# Patient Record
Sex: FEMALE | Race: WHITE
[De-identification: names, ages, dates, MRNs, and addresses within clinical notes are randomized per-mention and may not be internally consistent; named-entity substitution may affect disease eponyms.]

---

## 2021-08-09 ENCOUNTER — HOSPITAL ENCOUNTER (OUTPATIENT)
Dept: HOSPITAL 47 - RADMAMWWP | Age: 76
End: 2021-08-09
Attending: INTERNAL MEDICINE
Payer: MEDICARE

## 2021-08-09 VITALS — DIASTOLIC BLOOD PRESSURE: 50 MMHG | SYSTOLIC BLOOD PRESSURE: 125 MMHG | HEART RATE: 69 BPM | TEMPERATURE: 97.5 F

## 2021-08-09 VITALS — RESPIRATION RATE: 16 BRPM

## 2021-08-09 DIAGNOSIS — Z85.3: ICD-10-CM

## 2021-08-09 DIAGNOSIS — N63.20: Primary | ICD-10-CM

## 2021-08-09 DIAGNOSIS — Z78.0: ICD-10-CM

## 2021-08-09 DIAGNOSIS — C50.012: ICD-10-CM

## 2021-08-09 DIAGNOSIS — Z80.3: ICD-10-CM

## 2021-08-09 LAB — BUN SERPL-SCNC: 17 MG/DL (ref 7–17)

## 2021-08-09 PROCEDURE — 19083 BX BREAST 1ST LESION US IMAG: CPT

## 2021-08-09 PROCEDURE — 77062 BREAST TOMOSYNTHESIS BI: CPT

## 2021-08-09 PROCEDURE — 84520 ASSAY OF UREA NITROGEN: CPT

## 2021-08-09 PROCEDURE — 36415 COLL VENOUS BLD VENIPUNCTURE: CPT

## 2021-08-09 PROCEDURE — 77066 DX MAMMO INCL CAD BI: CPT

## 2021-08-09 PROCEDURE — 76642 ULTRASOUND BREAST LIMITED: CPT

## 2021-08-09 PROCEDURE — 70460 CT HEAD/BRAIN W/DYE: CPT

## 2021-08-09 PROCEDURE — 77065 DX MAMMO INCL CAD UNI: CPT

## 2021-08-09 PROCEDURE — 38525 BIOPSY/REMOVAL LYMPH NODES: CPT

## 2021-08-09 PROCEDURE — 82565 ASSAY OF CREATININE: CPT

## 2021-08-09 PROCEDURE — 88305 TISSUE EXAM BY PATHOLOGIST: CPT

## 2021-08-09 NOTE — USB
EXAMINATION TYPE: US biopsy breast VAD LT

 

DATE OF EXAM: 8/9/2021

 

CLINICAL HISTORY: N63 Breast Lump mass,C50.012,Z03.89,Z85.3 HX OF CA.

 

TECHNIQUE: Ultrasound guided vaccuum assisted core biopsy of left breast.  

 

COMPARISON: Mammogram 8/9/2021

 

FINDINGS: The ultrasound guided core biopsy procedure was explained to the patient.  The risks, benef
its, alternatives were discussed.  An informed consent was then obtained.  

 

Timeout was performed.

 

The patient was placed in supine positioning for  imaging and for the procedure. The overlying skin w
as prepped with betadine and sterilely draped in usual sterile fashion.  Lidocaine 1% was used as ane
sthetic into the skin and deeper breast tissue up to area of concern in the breast. Lidocaine with ep
inephrine was used deeper regions adjacent to the lesion. A small skin nick was made with surgical sc
alpel.  

 

Under ultrasound guidance, a 12-gauge vacuum assisted biopsy device was used to obtain 6 core samples
.  A biopsy clip was left in lesion.  Waiting clip was placed at the lateral edge of the lesion on ul
trasound.

 

Good hemostasis was obtained with direct pressure.  Discharge instructions were discussed with the jimmie edwards.  The patient will follow up with the referring physician for results.

 

Postprocedure mammogram: The patient was transferred to mammography for physician ordered post proced
ure mammogram for clip placement verification.  The clip is in the expected region of the biopsy.

 

The patient tolerated the procedure well without any immediate complication.  The patient was dischar
ged to home in stable condition.  

 

 

 

IMPRESSION: 

1. Successful ultrasound guided biopsy left breast. 

 

Recommendations:

1. Recommendations are pending pathology results.

## 2021-08-10 NOTE — MM
Reason for exam: clinical finding.



History:

Patient is postmenopausal, has history of breast cancer at age 75, and history of 

other cancer.

Family history of breast cancer in sister at age 60.

Chemotherapy, March 2021.  Benign excisional biopsy of the right breast, 2001.



Physical Findings:

Nurse did not find any significant physical abnormalities on exam.



MG 3D Diag Mammo W/Cad VIJI

Bilateral CC and MLO view(s) were taken.

No prior studies available for comparison.

The breast tissue is heterogeneously dense. This may lower the sensitivity of 

mammography.

Finding: There are extensive linear, fine, round, grouped/clustered calcifications

in the upper outer quadrant, anterior position of the left breast 3cm from the 

nipple. Previous mammotome biopsy in the right breast.



These results were verbally communicated with the patient and result sheet given 

to the patient on 8/9/21.





ASSESSMENT: Incomplete: need additional imaging evaluation, BI-RAD 0



RECOMMENDATION:

Ultrasound of the left breast.

## 2021-08-10 NOTE — USB
Reason for exam: additional evaluation requested from abnormal screening.



History:

Patient is postmenopausal, has history of breast cancer at age 75, and history of 

other cancer.

Family history of breast cancer in sister at age 60.

Chemotherapy, March 2021.  Benign excisional biopsy of the right breast, 2001.



US Breast Axilla LT

Left complete breast ultrasound includes all four quadrants, the retroareolar 

region and axilla. Finding demonstrates a 1.6 x 1.2 x 1.8cm spiculated, irregular,

hypoechoic, vascular lesion at 1 o'clock and a duct ectasia with debris and 

calcification at the posterior nipple.



These results were verbally communicated with the patient and result sheet given 

to the patient on 8/9/21.





ASSESSMENT: Suspicious, BI-RAD 4



RECOMMENDATION:

Ultrasound core biopsy of the left breast.



Called Dr. Chung's office with mammographic findings.

Biopsy scheduled for 8/9/21 at 1:00.



PRELIMINARY REPORT CALLED AND FAXED TO DR. CHUNG ON 8/9/21.

## 2021-09-07 ENCOUNTER — HOSPITAL ENCOUNTER (OUTPATIENT)
Dept: HOSPITAL 47 - RADCTMAIN | Age: 76
Discharge: HOME | End: 2021-09-07
Attending: INTERNAL MEDICINE
Payer: MEDICARE

## 2021-09-07 DIAGNOSIS — Z03.89: Primary | ICD-10-CM

## 2021-09-07 DIAGNOSIS — C50.012: ICD-10-CM

## 2021-09-07 LAB — BUN SERPL-SCNC: 19 MG/DL (ref 7–17)

## 2021-09-07 PROCEDURE — 71260 CT THORAX DX C+: CPT

## 2021-09-07 PROCEDURE — 84520 ASSAY OF UREA NITROGEN: CPT

## 2021-09-07 PROCEDURE — 82565 ASSAY OF CREATININE: CPT

## 2021-09-07 PROCEDURE — 74177 CT ABD & PELVIS W/CONTRAST: CPT

## 2021-09-07 PROCEDURE — 36415 COLL VENOUS BLD VENIPUNCTURE: CPT

## 2021-09-07 NOTE — CT
EXAMINATION TYPE: CT ChestAbdPelvis w con

 

DATE OF EXAM: 9/7/2021

 

COMPARISON: None at this institution.

 

HISTORY: Breast cancer, METS suspected

 

CT DLP: 736.1 mGycm. Automated Exposure Control for Dose Reduction was Utilized.

 

CONTRAST: 

CT scan of the thorax, abdomen and pelvis is performed with oral and with IV Contrast, patient inject
ed with 100 mL of Isovue 300.

 

FINDINGS:

 

LUNGS: Mild to moderate biapical pleural/parenchymal scarring. Mild to moderate bibasilar parenchymal
 linear scarring and/or atelectasis. Slightly more nodular appearance just above diaphragm, for refer
ence and 1.9 x 1.4 cm nodule or nodular thickening axial image 47. Additional smaller areas of nodula
rity in both bases noted Reticulation in the superior aspect right lower lobe

 

MEDIASTINUM: There are no greater than 1 cm hilar or mediastinal lymph nodes. Tiny pericardial effusi
on is seen.  Mild cardiomegaly.

 

OTHER: Right internal jugular Mediport catheter terminates in SVC. Surgical changes left breast just 
above the nipple axial image 28. Nonspecific but suspicious 9 mm left axillary lymph node axial image
 18.

 

LIVER/GB: Scattered hypodense lesions throughout the liver, some consistent with benign thin-walled c
ysts, others too small to definitively characterize like right hepatic dome lesion on axial image 51.


 

PANCREAS: No significant abnormality is seen.

 

SPLEEN: No significant abnormality is seen.

 

ADRENALS: No significant abnormality is seen.

 

KIDNEYS: No significant abnormality is seen.

 

BOWEL: Small sized hiatal hernia. Oral contrast reaches level of right colon. No suspicious small or 
large bowel dilatation

 

GENITAL ORGANS: Slightly retroflexed uterus. Roughly 1.0 cm fundal calcification possible calcified f
ibroid axial image 102 anteriorly.

 

LYMPH NODES: No greater than 1cm abdominal or pelvic lymph nodes are appreciated.

 

OSSEOUS STRUCTURES: Demineralization is present. Kyphoplasty at moderate to severe compression fractu
re L4 level. Slight grade 1 anterolisthesis L3 on L4. Suspicious round sclerotic lesion posterior to 
T9 level sagittal image 57. Suspicious smaller sclerotic foci involving T10 vertebra are thought pres
ent. Incidental calcified disc T10-T11 level.

 

OTHER: No significant additional abnormality is seen.

 

IMPRESSION: Nonspecific but Suspicious 9 mm left axillary lymph node.  Moderate basilar parenchymal s
carring and/or atelectasis with slightly suspicious nodular component just above diaphragm bilaterall
y. Nonspecific liver lesions. Nonspecific but suspicious sclerotic foci largest lesion T9 level. All 
findings should be correlated with old outside imaging to better evaluate if neoplastic in origin and
 to determine need for further workup.

## 2021-10-01 ENCOUNTER — HOSPITAL ENCOUNTER (OUTPATIENT)
Dept: HOSPITAL 47 - RADPETMAIN | Age: 76
Discharge: HOME | End: 2021-10-01
Attending: INTERNAL MEDICINE
Payer: MEDICARE

## 2021-10-01 DIAGNOSIS — C50.012: Primary | ICD-10-CM

## 2021-10-01 DIAGNOSIS — R91.1: ICD-10-CM

## 2021-10-01 PROCEDURE — 78815 PET IMAGE W/CT SKULL-THIGH: CPT

## 2021-10-01 NOTE — PE
EXAMINATION TYPE: PET CT fusion skull to thigh

 

DATE OF EXAM: 10/1/2021

 

COMPARISON: Most recent full body CT September 7, 2021

 

HISTORY: Recent abnormal CT, solitary pulmonary nodule.   

Left-sided breast cancer diagnosed and treated 2011 per patient. Recent benign left breast biopsy Aug
ust 2021

 

TECHNIQUE:  Following the intravenous administration of 12.58 mCi of F-18 FDG, whole body images are 
performed from the skull base to the midthigh.  Images are reviewed on the computer in the coronal, a
xial, and sagittal planes.  Reconstructed rotating images are created on independent workstation and 
reviewed on the computer.   A localization and attenuation correction CT is performed in conjunction 
with the PET scan. Blood glucose level equals 82.

 

SCAN: Subsequent Scan

 

FINDINGS: 

 

SKULL BASE AND NECK:  No areas of abnormal hypermetabolic uptake.

 

CHEST, MEDIASTINUM, AND HILAR REGION: Suspect treatment changes to the left breast laterally with monika
cifications axial image 88. Slightly prominent but subcentimeter left axillary lymph node axial image
 76 shows no hypermetabolic uptake. Slight nodularity or nodular scarring posteriorly in the lung bas
es remains present without abnormal hypermetabolic uptake. No areas of abnormal hypermetabolic uptake
.

 

ABDOMEN AND PELVIS: No areas of abnormal hypermetabolic uptake. Normal excretion.

 

OSSEOUS STRUCTURES: No areas of abnormal hypermetabolic uptake.

 

OTHER CT: Stable right internal jugular Mediport catheter. Coronary artery calcification redemonstrat
ed. Stable tiny pericardial effusion and mild cardiomegaly.

 

Simple appearing thin-walled cysts throughout the liver redemonstrated. Gallbladder surgically absent
. Small-sized hiatal hernia redemonstrated. Calcified near 1 cm uterine fibroid in the fundus redemon
strated.

 

Vertebral plasty at mild to moderate compression fracture L4 level again seen. Underlying scoliotic c
urvature. Facet arthropathy lower lumbar levels.

 

IMPRESSION: No suspicious hypermetabolic uptake to suggest metastatic hypermetabolic malignancy.

## 2021-11-02 ENCOUNTER — HOSPITAL ENCOUNTER (INPATIENT)
Dept: HOSPITAL 47 - EC | Age: 76
LOS: 2 days | Discharge: HOME | DRG: 54 | End: 2021-11-04
Attending: HOSPITALIST | Admitting: HOSPITALIST
Payer: MEDICARE

## 2021-11-02 ENCOUNTER — HOSPITAL ENCOUNTER (OUTPATIENT)
Dept: HOSPITAL 47 - RADCTMAIN | Age: 76
Discharge: HOME | End: 2021-11-02
Attending: INTERNAL MEDICINE
Payer: MEDICARE

## 2021-11-02 DIAGNOSIS — E05.90: ICD-10-CM

## 2021-11-02 DIAGNOSIS — C50.012: Primary | ICD-10-CM

## 2021-11-02 DIAGNOSIS — Z79.899: ICD-10-CM

## 2021-11-02 DIAGNOSIS — Z91.013: ICD-10-CM

## 2021-11-02 DIAGNOSIS — G93.6: ICD-10-CM

## 2021-11-02 DIAGNOSIS — Z03.89: ICD-10-CM

## 2021-11-02 DIAGNOSIS — Z20.822: ICD-10-CM

## 2021-11-02 DIAGNOSIS — Z85.3: ICD-10-CM

## 2021-11-02 DIAGNOSIS — R94.6: ICD-10-CM

## 2021-11-02 DIAGNOSIS — Z91.14: ICD-10-CM

## 2021-11-02 DIAGNOSIS — Z79.890: ICD-10-CM

## 2021-11-02 DIAGNOSIS — C79.31: Primary | ICD-10-CM

## 2021-11-02 DIAGNOSIS — I10: ICD-10-CM

## 2021-11-02 DIAGNOSIS — Z87.01: ICD-10-CM

## 2021-11-02 DIAGNOSIS — K52.9: ICD-10-CM

## 2021-11-02 DIAGNOSIS — C79.51: ICD-10-CM

## 2021-11-02 DIAGNOSIS — Z87.891: ICD-10-CM

## 2021-11-02 DIAGNOSIS — E03.9: ICD-10-CM

## 2021-11-02 DIAGNOSIS — G62.9: ICD-10-CM

## 2021-11-02 LAB
ALBUMIN SERPL-MCNC: 4 G/DL (ref 3.5–5)
ALP SERPL-CCNC: 51 U/L (ref 38–126)
ALT SERPL-CCNC: 16 U/L (ref 4–34)
ANION GAP SERPL CALC-SCNC: 6 MMOL/L
APTT BLD: 24.8 SEC (ref 22–30)
AST SERPL-CCNC: 33 U/L (ref 14–36)
BASOPHILS # BLD AUTO: 0 K/UL (ref 0–0.2)
BASOPHILS NFR BLD AUTO: 1 %
BUN SERPL-SCNC: 20 MG/DL (ref 7–17)
BUN SERPL-SCNC: 21 MG/DL (ref 7–17)
CALCIUM SPEC-MCNC: 10.9 MG/DL (ref 8.4–10.2)
CHLORIDE SERPL-SCNC: 100 MMOL/L (ref 98–107)
CO2 SERPL-SCNC: 30 MMOL/L (ref 22–30)
EOSINOPHIL # BLD AUTO: 0.1 K/UL (ref 0–0.7)
EOSINOPHIL NFR BLD AUTO: 3 %
ERYTHROCYTE [DISTWIDTH] IN BLOOD BY AUTOMATED COUNT: 4.15 M/UL (ref 3.8–5.4)
ERYTHROCYTE [DISTWIDTH] IN BLOOD: 16.2 % (ref 11.5–15.5)
GLUCOSE SERPL-MCNC: 96 MG/DL (ref 74–99)
HCT VFR BLD AUTO: 38.6 % (ref 34–46)
HGB BLD-MCNC: 12.5 GM/DL (ref 11.4–16)
INR PPP: 1 (ref ?–1.2)
LYMPHOCYTES # SPEC AUTO: 1.5 K/UL (ref 1–4.8)
LYMPHOCYTES NFR SPEC AUTO: 30 %
MAGNESIUM SPEC-SCNC: 2.2 MG/DL (ref 1.6–2.3)
MCH RBC QN AUTO: 30 PG (ref 25–35)
MCHC RBC AUTO-ENTMCNC: 32.3 G/DL (ref 31–37)
MCV RBC AUTO: 93 FL (ref 80–100)
MONOCYTES # BLD AUTO: 0.3 K/UL (ref 0–1)
MONOCYTES NFR BLD AUTO: 6 %
NEUTROPHILS # BLD AUTO: 2.9 K/UL (ref 1.3–7.7)
NEUTROPHILS NFR BLD AUTO: 59 %
PLATELET # BLD AUTO: 206 K/UL (ref 150–450)
POTASSIUM SERPL-SCNC: 4.1 MMOL/L (ref 3.5–5.1)
PROT SERPL-MCNC: 6.8 G/DL (ref 6.3–8.2)
PT BLD: 10.6 SEC (ref 9–12)
SODIUM SERPL-SCNC: 136 MMOL/L (ref 137–145)
WBC # BLD AUTO: 5 K/UL (ref 3.8–10.6)

## 2021-11-02 PROCEDURE — 96374 THER/PROPH/DIAG INJ IV PUSH: CPT

## 2021-11-02 PROCEDURE — 85025 COMPLETE CBC W/AUTO DIFF WBC: CPT

## 2021-11-02 PROCEDURE — 96376 TX/PRO/DX INJ SAME DRUG ADON: CPT

## 2021-11-02 PROCEDURE — 84443 ASSAY THYROID STIM HORMONE: CPT

## 2021-11-02 PROCEDURE — 87635 SARS-COV-2 COVID-19 AMP PRB: CPT

## 2021-11-02 PROCEDURE — 85610 PROTHROMBIN TIME: CPT

## 2021-11-02 PROCEDURE — 36415 COLL VENOUS BLD VENIPUNCTURE: CPT

## 2021-11-02 PROCEDURE — 85730 THROMBOPLASTIN TIME PARTIAL: CPT

## 2021-11-02 PROCEDURE — 70553 MRI BRAIN STEM W/O & W/DYE: CPT

## 2021-11-02 PROCEDURE — 70470 CT HEAD/BRAIN W/O & W/DYE: CPT

## 2021-11-02 PROCEDURE — 82565 ASSAY OF CREATININE: CPT

## 2021-11-02 PROCEDURE — 96361 HYDRATE IV INFUSION ADD-ON: CPT

## 2021-11-02 PROCEDURE — 99285 EMERGENCY DEPT VISIT HI MDM: CPT

## 2021-11-02 PROCEDURE — 84484 ASSAY OF TROPONIN QUANT: CPT

## 2021-11-02 PROCEDURE — 80048 BASIC METABOLIC PNL TOTAL CA: CPT

## 2021-11-02 PROCEDURE — 80053 COMPREHEN METABOLIC PANEL: CPT

## 2021-11-02 PROCEDURE — 84520 ASSAY OF UREA NITROGEN: CPT

## 2021-11-02 PROCEDURE — 84100 ASSAY OF PHOSPHORUS: CPT

## 2021-11-02 PROCEDURE — 83735 ASSAY OF MAGNESIUM: CPT

## 2021-11-02 PROCEDURE — 84439 ASSAY OF FREE THYROXINE: CPT

## 2021-11-02 RX ADMIN — DEXTROSE STA MG: 50 INJECTION, SOLUTION INTRAVENOUS at 23:22

## 2021-11-02 RX ADMIN — CEFAZOLIN SCH: 330 INJECTION, POWDER, FOR SOLUTION INTRAMUSCULAR; INTRAVENOUS at 23:22

## 2021-11-02 RX ADMIN — DEXTROSE STA MG: 50 INJECTION, SOLUTION INTRAVENOUS at 22:12

## 2021-11-02 NOTE — CT
EXAMINATION TYPE: CT brain wo/w con

 

DATE OF EXAM: 11/2/2021

 

COMPARISON: CT brain August 9, 2021

 

HISTORY: Breast cancer, suspected mets

 

CT DLP: 1991.00 mGycm

Automated exposure control for dose reduction was used.

 

CONTRAST: 

CT scan of the head is performed without and with IV Contrast, patient injected with 100 ml mL of Iso
valentino 300.

 

FINDINGS: 

There is no acute intracranial hemorrhage or midline shift. New rim-enhancing 2.6 x 2.8 x 2.8 cm cyst
ic lesion with surrounding basal ganglia and right parietal lobe axial image 32 and coronal image 41.
 New peripheral lower right parietal 2.9 x 2.1 x 1.9 cm rim-enhancing cystic lesion axial image 21 an
d coronal image 41 with surrounding vasogenic edema. The ventricles and sulci remaining within normal
 limits in size.  The globes are intact and the visualized sinuses are clear. Nasal septum remains de
viated to left of midline.

 

IMPRESSION: 2 new rim-enhancing right-sided cystic lesions with local mass effect/surrounding vasogen
ic edema favor metastatic disease given history of breast cancer. Other etiologies not entirely exclu
ded.

 

A Orange level critical message alert has been initiated for Maureen Ellsworth MD via the [employer] 36
0 | Critical Results System on 11/2/2021 1:52 PM.  This message alert has been sent to Maureen Ellsworth MD via the preferences provided by the clinician for the receipt of Radiology Critical Findings. [facility] – [place] [id number].

## 2021-11-02 NOTE — ED
Recheck HPI





- General


Chief Complaint: Recheck/Abnormal Lab/Rx


Stated Complaint: Irregular CT


Time Seen by Provider: 11/02/21 21:32


Source: patient, family, RN notes reviewed, old records reviewed, Caregiver


Mode of arrival: ambulatory


Limitations: no limitations, altered mental status





- History of Present Illness


Initial Comments: 





This is a 75-year-old female to the emergency room x-ray.  Patient Dese for 

evaluation of altered mental status with altered an abnormal outpatient computed

tomography scan.  Patient presents today for evaluation regards to these 

symptoms.  Patient was called and told to come the hospital secondary to 

abnormal computed tomography scan.  Patient is aware of positive computed 

tomography scan findings for brain metastasis


MD Complaint: abnormal lab (Computed tomography scan positive for brain m

etastasis)


Initial Visit For: other (Occasional headaches and altered mental status)


Returns Today for: persistent/worsening pain related to initial visit


Symptoms Since Prior Visit: worsening pain


Context: called for abnormal lab result (Computed tomography scan abnormal)


Associated Symptoms: none


Treatments Prior to Arrival: home treatments





- Related Data


                                Home Medications











 Medication  Instructions  Recorded  Confirmed


 


Levothyroxine Sodium [Synthroid] 150 mcg PO DAILY 08/02/21 11/02/21


 


Calcium Carbonate [Calcium] 600 mg PO DAILY 11/02/21 11/02/21


 


Cholecalciferol [Vitamin D3 (25 25 mcg PO DAILY 11/02/21 11/02/21





Mcg = 1000 Iu)]   


 


Gabapentin [Neurontin] 300 mg PO DAILY 11/02/21 11/02/21


 


HYDROmorphone HCL 2 - 4 mg PO Q3H PRN 11/02/21 11/02/21


 


Sennosides [Senna] 17.2 mg PO DAILY 11/02/21 11/02/21


 


fentaNYL 50MCG/HR PATCH [Duragesic 50 mcg TRANSDERM Q72H 11/02/21 11/02/21





50MCG/HR]   











                                    Allergies











Allergy/AdvReac Type Severity Reaction Status Date / Time


 


shellfish derived [Shellfish] Allergy  Vomiting Verified 11/02/21 21:54














Review of Systems


ROS Statement: 


Those systems with pertinent positive or pertinent negative responses have been 

documented in the HPI.





ROS Other: All systems not noted in ROS Statement are negative.





Past Medical History


Past Medical History: Cancer, Hypertension, Pneumonia, Thyroid Disorder


Additional Past Medical History / Comment(s): cancer to the spine, brain, lungs,

 chemo 2021


History of Any Multi-Drug Resistant Organisms: None Reported


Past Surgical History: Back Surgery


Additional Past Surgical History / Comment(s): L4 surgery March 2021


Past Anesthesia/Blood Transfusion Reactions: No Reported Reaction


Past Psychological History: No Psychological Hx Reported


Smoking Status: Former smoker


Past Alcohol Use History: None Reported


Past Drug Use History: None Reported





General Exam


Limitations: no limitations


General appearance: alert, in no apparent distress


Head exam: Present: atraumatic, normocephalic, normal inspection


Eye exam: Present: normal appearance, PERRL, EOMI.  Absent: scleral icterus, 

conjunctival injection, periorbital swelling


ENT exam: Present: normal exam, mucous membranes moist


Neck exam: Present: normal inspection.  Absent: tenderness, meningismus, 

lymphadenopathy


Respiratory exam: Present: normal lung sounds bilaterally.  Absent: respiratory 

distress, wheezes, rales, rhonchi, stridor


Cardiovascular Exam: Present: regular rate, normal rhythm, normal heart sounds. 

 Absent: systolic murmur, diastolic murmur, rubs, gallop, clicks


GI/Abdominal exam: Present: soft, normal bowel sounds.  Absent: distended, 

tenderness, guarding, rebound, rigid


Extremities exam: Present: normal inspection, full ROM, normal capillary refill.

  Absent: tenderness, pedal edema, joint swelling, calf tenderness


Back exam: Present: normal inspection


Neurological exam: Present: alert, oriented X3, CN II-XII intact


Psychiatric exam: Present: normal affect, normal mood


Skin exam: Present: warm, dry, intact, normal color.  Absent: rash





Course


                                   Vital Signs











  11/02/21





  21:25


 


Temperature 98.7 F


 


Pulse Rate 71


 


Respiratory 20





Rate 


 


Blood Pressure 172/77


 


O2 Sat by Pulse 99





Oximetry 














- Reevaluation(s)


Reevaluation #1: 





11/02/21 22:50


Medical record is reviewed


Reevaluation #2: 





11/02/21 22:50


Patient family informed of results and questions have been answered





- Consultations


Consultation #1: 





Spoke with [facility] who agree to admit this patient





Medical Decision Making





- Medical Decision Making





75 female with breast cancer now no metastasis to brain.  Patient be admitted 

for steroids secondary to vasogenic edema, monitoring and cancer control





- Lab Data


Result diagrams: 


                                 11/02/21 21:56





                                 11/02/21 21:56


                                   Lab Results











  11/02/21 11/02/21 11/02/21 Range/Units





  21:56 21:56 21:56 


 


WBC  5.0    (3.8-10.6)  k/uL


 


RBC  4.15    (3.80-5.40)  m/uL


 


Hgb  12.5    (11.4-16.0)  gm/dL


 


Hct  38.6    (34.0-46.0)  %


 


MCV  93.0    (80.0-100.0)  fL


 


MCH  30.0    (25.0-35.0)  pg


 


MCHC  32.3    (31.0-37.0)  g/dL


 


RDW  16.2 H    (11.5-15.5)  %


 


Plt Count  206    (150-450)  k/uL


 


MPV  7.2    


 


Neutrophils %  59    %


 


Lymphocytes %  30    %


 


Monocytes %  6    %


 


Eosinophils %  3    %


 


Basophils %  1    %


 


Neutrophils #  2.9    (1.3-7.7)  k/uL


 


Lymphocytes #  1.5    (1.0-4.8)  k/uL


 


Monocytes #  0.3    (0-1.0)  k/uL


 


Eosinophils #  0.1    (0-0.7)  k/uL


 


Basophils #  0.0    (0-0.2)  k/uL


 


Anisocytosis  Slight    


 


PT   10.6   (9.0-12.0)  sec


 


INR   1.0   (<1.2)  


 


APTT   24.8   (22.0-30.0)  sec


 


Sodium    136 L  (137-145)  mmol/L


 


Potassium    4.1  (3.5-5.1)  mmol/L


 


Chloride    100  ()  mmol/L


 


Carbon Dioxide    30  (22-30)  mmol/L


 


Anion Gap    6  mmol/L


 


BUN    20 H  (7-17)  mg/dL


 


Creatinine    0.83  (0.52-1.04)  mg/dL


 


Est GFR (CKD-EPI)AfAm    81  (>60 ml/min/1.73 sqM)  


 


Est GFR (CKD-EPI)NonAf    70  (>60 ml/min/1.73 sqM)  


 


Glucose    96  (74-99)  mg/dL


 


Calcium    10.9 H  (8.4-10.2)  mg/dL


 


Phosphorus    3.9  (2.5-4.5)  mg/dL


 


Magnesium    2.2  (1.6-2.3)  mg/dL


 


Total Bilirubin    0.4  (0.2-1.3)  mg/dL


 


AST    33  (14-36)  U/L


 


ALT    16  (4-34)  U/L


 


Alkaline Phosphatase    51  ()  U/L


 


Troponin I     (0.000-0.034)  ng/mL


 


Total Protein    6.8  (6.3-8.2)  g/dL


 


Albumin    4.0  (3.5-5.0)  g/dL














  11/02/21 Range/Units





  21:56 


 


WBC   (3.8-10.6)  k/uL


 


RBC   (3.80-5.40)  m/uL


 


Hgb   (11.4-16.0)  gm/dL


 


Hct   (34.0-46.0)  %


 


MCV   (80.0-100.0)  fL


 


MCH   (25.0-35.0)  pg


 


MCHC   (31.0-37.0)  g/dL


 


RDW   (11.5-15.5)  %


 


Plt Count   (150-450)  k/uL


 


MPV   


 


Neutrophils %   %


 


Lymphocytes %   %


 


Monocytes %   %


 


Eosinophils %   %


 


Basophils %   %


 


Neutrophils #   (1.3-7.7)  k/uL


 


Lymphocytes #   (1.0-4.8)  k/uL


 


Monocytes #   (0-1.0)  k/uL


 


Eosinophils #   (0-0.7)  k/uL


 


Basophils #   (0-0.2)  k/uL


 


Anisocytosis   


 


PT   (9.0-12.0)  sec


 


INR   (<1.2)  


 


APTT   (22.0-30.0)  sec


 


Sodium   (137-145)  mmol/L


 


Potassium   (3.5-5.1)  mmol/L


 


Chloride   ()  mmol/L


 


Carbon Dioxide   (22-30)  mmol/L


 


Anion Gap   mmol/L


 


BUN   (7-17)  mg/dL


 


Creatinine   (0.52-1.04)  mg/dL


 


Est GFR (CKD-EPI)AfAm   (>60 ml/min/1.73 sqM)  


 


Est GFR (CKD-EPI)NonAf   (>60 ml/min/1.73 sqM)  


 


Glucose   (74-99)  mg/dL


 


Calcium   (8.4-10.2)  mg/dL


 


Phosphorus   (2.5-4.5)  mg/dL


 


Magnesium   (1.6-2.3)  mg/dL


 


Total Bilirubin   (0.2-1.3)  mg/dL


 


AST   (14-36)  U/L


 


ALT   (4-34)  U/L


 


Alkaline Phosphatase   ()  U/L


 


Troponin I  <0.012  (0.000-0.034)  ng/mL


 


Total Protein   (6.3-8.2)  g/dL


 


Albumin   (3.5-5.0)  g/dL














- Radiology Data


Radiology results: report reviewed (CT brain done an outpatient basis was 

positive for brain metastasis)





Disposition


Clinical Impression: 


 Altered mental state, Brain metastasis, Breast cancer





Disposition: ADMITTED AS IP TO THIS HOSP


Condition: Fair


Is patient prescribed a controlled substance at d/c from ED?: No


Referrals: 


Briseida Griffith MD [Primary Care Provider] - 1-2 days

## 2021-11-03 ENCOUNTER — HOSPITAL ENCOUNTER (OUTPATIENT)
Dept: HOSPITAL 47 - RADONCKCI | Age: 76
Discharge: HOME | End: 2021-11-03
Attending: RADIOLOGY
Payer: MEDICARE

## 2021-11-03 DIAGNOSIS — Z53.9: Primary | ICD-10-CM

## 2021-11-03 LAB
ALBUMIN SERPL-MCNC: 3.2 G/DL (ref 3.5–5)
ALP SERPL-CCNC: 39 U/L (ref 38–126)
ALT SERPL-CCNC: 14 U/L (ref 4–34)
ANION GAP SERPL CALC-SCNC: 5 MMOL/L
AST SERPL-CCNC: 27 U/L (ref 14–36)
BASOPHILS # BLD AUTO: 0 K/UL (ref 0–0.2)
BASOPHILS NFR BLD AUTO: 0 %
BUN SERPL-SCNC: 18 MG/DL (ref 7–17)
CALCIUM SPEC-MCNC: 9.9 MG/DL (ref 8.4–10.2)
CHLORIDE SERPL-SCNC: 102 MMOL/L (ref 98–107)
CO2 SERPL-SCNC: 29 MMOL/L (ref 22–30)
EOSINOPHIL # BLD AUTO: 0.1 K/UL (ref 0–0.7)
EOSINOPHIL NFR BLD AUTO: 3 %
ERYTHROCYTE [DISTWIDTH] IN BLOOD BY AUTOMATED COUNT: 3.54 M/UL (ref 3.8–5.4)
ERYTHROCYTE [DISTWIDTH] IN BLOOD: 16 % (ref 11.5–15.5)
GLUCOSE SERPL-MCNC: 82 MG/DL (ref 74–99)
HCT VFR BLD AUTO: 33.4 % (ref 34–46)
HGB BLD-MCNC: 10.7 GM/DL (ref 11.4–16)
LYMPHOCYTES # SPEC AUTO: 1.4 K/UL (ref 1–4.8)
LYMPHOCYTES NFR SPEC AUTO: 37 %
MCH RBC QN AUTO: 30.2 PG (ref 25–35)
MCHC RBC AUTO-ENTMCNC: 32 G/DL (ref 31–37)
MCV RBC AUTO: 94.5 FL (ref 80–100)
MONOCYTES # BLD AUTO: 0.2 K/UL (ref 0–1)
MONOCYTES NFR BLD AUTO: 6 %
NEUTROPHILS # BLD AUTO: 1.9 K/UL (ref 1.3–7.7)
NEUTROPHILS NFR BLD AUTO: 50 %
PLATELET # BLD AUTO: 170 K/UL (ref 150–450)
POTASSIUM SERPL-SCNC: 3.9 MMOL/L (ref 3.5–5.1)
PROT SERPL-MCNC: 5.8 G/DL (ref 6.3–8.2)
SODIUM SERPL-SCNC: 136 MMOL/L (ref 137–145)
T4 FREE SERPL-MCNC: 1.02 NG/DL (ref 0.78–2.19)
WBC # BLD AUTO: 3.9 K/UL (ref 3.8–10.6)

## 2021-11-03 RX ADMIN — CEFAZOLIN SCH MLS/HR: 330 INJECTION, POWDER, FOR SOLUTION INTRAMUSCULAR; INTRAVENOUS at 09:14

## 2021-11-03 RX ADMIN — PANTOPRAZOLE SODIUM SCH MG: 40 TABLET, DELAYED RELEASE ORAL at 17:34

## 2021-11-03 RX ADMIN — DEXTROSE SCH MG: 50 INJECTION, SOLUTION INTRAVENOUS at 09:14

## 2021-11-03 RX ADMIN — DEXTROSE SCH MG: 50 INJECTION, SOLUTION INTRAVENOUS at 05:13

## 2021-11-03 RX ADMIN — CALCIUM CARBONATE (ANTACID) CHEW TAB 500 MG PRN MG: 500 CHEW TAB at 15:51

## 2021-11-03 RX ADMIN — CEFAZOLIN SCH MLS/HR: 330 INJECTION, POWDER, FOR SOLUTION INTRAMUSCULAR; INTRAVENOUS at 17:34

## 2021-11-03 RX ADMIN — DEXTROSE SCH: 50 INJECTION, SOLUTION INTRAVENOUS at 10:22

## 2021-11-03 RX ADMIN — DEXTROSE SCH MG: 50 INJECTION, SOLUTION INTRAVENOUS at 20:36

## 2021-11-03 RX ADMIN — DEXTROSE SCH MG: 50 INJECTION, SOLUTION INTRAVENOUS at 15:52

## 2021-11-03 RX ADMIN — CALCIUM CARBONATE (ANTACID) CHEW TAB 500 MG PRN MG: 500 CHEW TAB at 21:12

## 2021-11-03 NOTE — P.HPIM
History of Present Illness


Patient was a 75-year-old female was sent in because of her memory difficulties 

and patient is found to have computed tomography scan as an outpatient and found

to have a metastatic lesion in the brain along with vasogenic edema after which 

patient was a presented to the hospital and patient will undergo MRI imaging.  

Patient recently moved to the town patient was a diagnosed with a breast cancer 

received chemotherapy at her previous location patient is still undergoing 

workup now here and does not initiated on chemotherapy here in this town.  

Patient denied any fever chills patient has elevated TSH with normal T4 patient 

is supposed to take levothyroxine which she has not been taking and patient is 

being resumed on that medication at this time.











REVIEW OF SYSTEMS: 


CONSTITUTIONAL: No fever, no malaise, no fatigue. 


HEENT: No recent visual problems or hearing problems. Denied any sore throat. 


CARDIOVASCULAR: No chest pain, orthopnea, PND, no palpitations, no syncope. 


PULMONARY: No shortness of breath, no cough, no hemoptysis. 


GASTROINTESTINAL: No diarrhea, no nausea, no vomiting, no abdominal pain. 


NEUROLOGICAL: No headaches, no weakness, no numbness. 


HEMATOLOGICAL: Denies any bleeding or petechiae. 


GENITOURINARY: Denies any burning micturition, frequency, or urgency. 


MUSCULOSKELETAL/RHEUMATOLOGICAL: Denies any joint pain, swelling, or any muscle 

pain. 


ENDOCRINE: Denies any polyuria or polydipsia. 





The rest of the 14-point review of systems is negative.











PHYSICAL EXAMINATION: 





GENERAL: The patient is alert and oriented x3, not in any acute distress. Well 

developed, well nourished. 


HEENT: Pupils are round and equally reacting to light. EOMI. No scleral icterus.

No conjunctival pallor. Normocephalic, atraumatic. No pharyngeal erythema. No 

thyromegaly. 


CARDIOVASCULAR: S1 and S2 present. No murmurs, rubs, or gallops. 


PULMONARY: Chest is clear to auscultation, no wheezing or crackles. 


ABDOMEN: Soft, nontender, nondistended, normoactive bowel sounds. No palpable 

organomegaly. 


MUSCULOSKELETAL: No joint swelling or deformity.


EXTREMITIES: No cyanosis, clubbing, or pedal edema. 


NEUROLOGICAL: Gross neurological examination did not reveal any focal deficits. 


SKIN: No rashes. 





Assessment and plan


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 

Decadron which will be continued patient will be monitored closely.  Oncology 

evaluated the patient. 


- hyperthyroidism patient will be resumed on her levothyroxine patient has 

elevated TSH as patient has not been taking her levothyroxine.


-Breast cancer metastatic disease not starting chemotherapy at patient has 

metastases to spine


-Hypertension


-Peripheral neuropathy


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 

was resumed patient denied any constipation at this time





DVT prophylaxis: Lovenox








Past Medical History


Past Medical History: Cancer, Hypertension, Pneumonia, Thyroid Disorder


Additional Past Medical History / Comment(s): cancer to the breast Ca with mets 

to spine, brain, liver, lungs, chemo 2021


History of Any Multi-Drug Resistant Organisms: None Reported


Past Surgical History: Back Surgery


Additional Past Surgical History / Comment(s): L4 surgery March 2021


Past Anesthesia/Blood Transfusion Reactions: No Reported Reaction


Past Psychological History: No Psychological Hx Reported


Smoking Status: Former smoker


Past Alcohol Use History: None Reported


Past Drug Use History: None Reported





- Past Family History


  ** Father


Family Medical History: Cancer


Additional Family Medical History / Comment(s): colon





  ** Sister(s)


Family Medical History: Cancer


Additional Family Medical History / Comment(s): breast





Medications and Allergies


                                Home Medications











 Medication  Instructions  Recorded  Confirmed  Type


 


Levothyroxine Sodium [Synthroid] 150 mcg PO DAILY 08/02/21 11/03/21 History


 


Calcium Carbonate [Calcium] 600 mg PO DAILY 11/02/21 11/03/21 History


 


Cholecalciferol [Vitamin D3 (25 25 mcg PO DAILY 11/02/21 11/03/21 History





Mcg = 1000 Iu)]    


 


Gabapentin [Neurontin] 300 mg PO DAILY 11/02/21 11/03/21 History


 


HYDROmorphone HCL 2 - 4 mg PO Q3H PRN 11/02/21 11/03/21 History


 


Sennosides [Senna] 17.2 mg PO DAILY 11/02/21 11/03/21 History


 


fentaNYL 50MCG/HR PATCH [Duragesic 50 mcg TRANSDERM Q72H 11/02/21 11/03/21 

History





50MCG/HR]    








                                    Allergies











Allergy/AdvReac Type Severity Reaction Status Date / Time


 


shellfish derived [Shellfish] Allergy  Vomiting Verified 11/03/21 11:28














Physical Exam


Vitals: 


                                   Vital Signs











  Temp Pulse Resp BP Pulse Ox


 


 11/03/21 10:58      97


 


 11/03/21 07:26  98.0 F  66  18  147/81  100


 


 11/03/21 06:55   61  18  129/66  99


 


 11/03/21 04:48   75  16  156/81  95


 


 11/02/21 21:25  98.7 F  71  20  172/77  99








                                Intake and Output











 11/02/21 11/03/21 11/03/21





 22:59 06:59 14:59


 


Other:   


 


  Weight 77.111 kg  78.5 kg














Results


CBC & Chem 7: 


                                 11/03/21 03:11





                                 11/03/21 03:11


Labs: 


                  Abnormal Lab Results - Last 24 Hours (Table)











  11/02/21 11/02/21 11/02/21 Range/Units





  21:56 21:56 21:56 


 


RBC     (3.80-5.40)  m/uL


 


Hgb     (11.4-16.0)  gm/dL


 


Hct     (34.0-46.0)  %


 


RDW  16.2 H    (11.5-15.5)  %


 


Sodium   136 L   (137-145)  mmol/L


 


BUN   20 H   (7-17)  mg/dL


 


Calcium   10.9 H   (8.4-10.2)  mg/dL


 


Total Protein     (6.3-8.2)  g/dL


 


Albumin     (3.5-5.0)  g/dL


 


TSH    49.400 H  (0.465-4.680)  mIU/L














  11/03/21 11/03/21 Range/Units





  03:11 03:11 


 


RBC  3.54 L   (3.80-5.40)  m/uL


 


Hgb  10.7 L   (11.4-16.0)  gm/dL


 


Hct  33.4 L   (34.0-46.0)  %


 


RDW  16.0 H   (11.5-15.5)  %


 


Sodium   136 L  (137-145)  mmol/L


 


BUN   18 H  (7-17)  mg/dL


 


Calcium    (8.4-10.2)  mg/dL


 


Total Protein   5.8 L  (6.3-8.2)  g/dL


 


Albumin   3.2 L  (3.5-5.0)  g/dL


 


TSH    (0.465-4.680)  mIU/L














Thrombosis Risk Factor Assmnt





- Choose All That Apply


Any of the Below Risk Factors Present?: Yes


Each Factor Represents 1 point: Obesity (BMI >25)


Other Risk Factors: Yes


Each Risk Factor Represents 2 Points: Age 61-74 years, Malignancy


Other congenital or acquired thrombophilia - If yes, enter type in comment: No


Thrombosis Risk Factor Assessment Total Risk Factor Score: 5


Thrombosis Risk Factor Assessment Level: High Risk

## 2021-11-03 NOTE — MR
EXAMINATION TYPE: MR brain wo/w con

 

DATE OF EXAM: 11/3/2021

 

COMPARISON: CT brain from 1 day earlier.

 

HISTORY: Breast cancer, abnormal CT.

 

TECHNIQUE: 

Multiplanar, multisequence images of the brain and brainstem is performed without and with IV contras
t, utilizing 8 mL intravenous Gadavist .

 

FINDINGS: Diffusion weighted images demonstrate no evidence of a recent infarct or other diffusion ab
normality.  There is mild ventricular and sulcal prominence.

 

Midline structures demonstrate normal morphology.  The craniocervical junction appears within normal 
limits.  

 

There is persistent thin-walled cystic lesion with peripheral rim enhancement that has some anterior 
septation and posterior more thickened nodular component high right parieto-occipital level measuring
 3.4 x 2.8 x 3.2 cm axial image 16 and coronal image 78 with surrounding vasogenic edema extending po
steriorly and inferiorly. There is second thin-walled cyst or cystic lesion measuring 2.9 x 2.3 x 1.9
 cm axial image 43 and coronal image 78 rim and thin septal enhancement with a enhancing thickened co
mponent posterior inferiorly in the inferior right parietal region. Less well seen on CT there is a t
hird rim-enhancing 6 mm focus with surrounding vasogenic edema high posterior left frontal lobe axial
 image 63.

 

 Dural venous sinuses appear patent. The visualized sinuses are clear and the globes are intact. Nasa
l septum remains deviated to left of midline.

 

IMPRESSION: 2 rim-enhancing larger cystic lesions redemonstrated right parietal occipital level and i
nferior right parietal level. There is a third rim-enhancing 6 mm cystic lesion posterior left fronta
l lobe. New findings from August 19, 2021 CT. Differential is somewhat extensive but  metastatic dise
ase is favored in patient with history of recently diagnosed breast cancer.

## 2021-11-03 NOTE — P.CONS
History of Present Illness





- Reason for Consult


Consult date: 11/03/21


Metastatic Breast Cancer


Requesting physician: Yuriy Davis





- Chief Complaint


MS changes





- History of Present Illness





Mrs. Sierra is a pleasant female who presented to the emergency department 

after she had a CT scan that revealed two likely metastatic lesions in the brain

with associated surrounding vasogenic edema. No midline shift identified. 

Patient was diagnosed with Metastatic breast cancer in March 2021, She underwent

molecular and biomarker testing and started on Abraxane and atezolumab. She also

underwent palliaitive radiation to destructive lesion in T4. In June 2021 her 

treatment was unfortunetley complicated with colitis and pneumonitis requiring 

hospitalizations. Her most recent PET scan did not show any progressive disease 

or new metastatic lesions. However her mental status, memory, emotional control 

has been labile per daughter (who she lives with). She had initially refused MRI

of brain but agreed to CT. CT scan revealing two new lesions on 11/2/21, with 

patient symptomatic and worsening over past week it was unclear if this was r

elated to new brain mets or thyroid (she had stopped taking her synthroid 

without telling anyone per daughter). 





She was given a bolus dose dexamethasone in hospital, and then continued on 4mg 

q6, with PPI. MRI brain ordered and plan for ativan prior, discussed with 

patient and daughter. Medical team managing thyroid. 





Review of Systems


All systems: negative


Constitutional: Reports as per HPI





Past Medical History


Past Medical History: Cancer, Hypertension, Pneumonia, Thyroid Disorder


Additional Past Medical History / Comment(s): cancer to the spine, brain, lungs,

chemo 2021


History of Any Multi-Drug Resistant Organisms: None Reported


Past Surgical History: Back Surgery


Additional Past Surgical History / Comment(s): L4 surgery March 2021


Past Anesthesia/Blood Transfusion Reactions: No Reported Reaction


Past Psychological History: No Psychological Hx Reported


Smoking Status: Former smoker


Past Alcohol Use History: None Reported


Past Drug Use History: None Reported





- Past Family History


  ** Father


Family Medical History: Cancer


Additional Family Medical History / Comment(s): colon





  ** Sister(s)


Family Medical History: Cancer


Additional Family Medical History / Comment(s): breast





Medications and Allergies


                                Home Medications











 Medication  Instructions  Recorded  Confirmed  Type


 


Levothyroxine Sodium [Synthroid] 150 mcg PO DAILY 08/02/21 11/03/21 History


 


Calcium Carbonate [Calcium] 600 mg PO DAILY 11/02/21 11/03/21 History


 


Cholecalciferol [Vitamin D3 (25 25 mcg PO DAILY 11/02/21 11/03/21 History





Mcg = 1000 Iu)]    


 


Gabapentin [Neurontin] 300 mg PO DAILY 11/02/21 11/03/21 History


 


HYDROmorphone HCL 2 - 4 mg PO Q3H PRN 11/02/21 11/03/21 History


 


Sennosides [Senna] 17.2 mg PO DAILY 11/02/21 11/03/21 History


 


fentaNYL 50MCG/HR PATCH [Duragesic 50 mcg TRANSDERM Q72H 11/02/21 11/03/21 

History





50MCG/HR]    








                                    Allergies











Allergy/AdvReac Type Severity Reaction Status Date / Time


 


shellfish derived [Shellfish] Allergy  Vomiting Verified 11/03/21 11:28














Physical Exam


Vitals: 


                                   Vital Signs











  Temp Pulse Resp BP Pulse Ox


 


 11/03/21 07:26  98.0 F  66  18  147/81  100


 


 11/03/21 06:55   61  18  129/66  99


 


 11/03/21 04:48   75  16  156/81  95


 


 11/02/21 21:25  98.7 F  71  20  172/77  99








                                Intake and Output











 11/02/21 11/03/21 11/03/21





 22:59 06:59 14:59


 


Other:   


 


  Weight 77.111 kg  














- Constitutional


General appearance: cooperative, no acute distress





- EENT


Eyes: EOMI


ENT: NA/AT





- Respiratory


Respiratory: bilateral: CTA





- Cardiovascular


Rhythm: regular





- Gastrointestinal


General gastrointestinal: normal bowel sounds, soft





- Integumentary


Integumentary: pale





- Musculoskeletal


Musculoskeletal: generalized weakness





- Psychiatric





poor historian


Psychiatric: A&O x's 3





Results


CBC & Chem 7: 


                                 11/03/21 03:11





                                 11/03/21 03:11


Labs: 


                  Abnormal Lab Results - Last 24 Hours (Table)











  11/02/21 11/02/21 11/03/21 Range/Units





  21:56 21:56 03:11 


 


RBC    3.54 L  (3.80-5.40)  m/uL


 


Hgb    10.7 L  (11.4-16.0)  gm/dL


 


Hct    33.4 L  (34.0-46.0)  %


 


RDW  16.2 H   16.0 H  (11.5-15.5)  %


 


Sodium   136 L   (137-145)  mmol/L


 


BUN   20 H   (7-17)  mg/dL


 


Calcium   10.9 H   (8.4-10.2)  mg/dL


 


Total Protein     (6.3-8.2)  g/dL


 


Albumin     (3.5-5.0)  g/dL














  11/03/21 Range/Units





  03:11 


 


RBC   (3.80-5.40)  m/uL


 


Hgb   (11.4-16.0)  gm/dL


 


Hct   (34.0-46.0)  %


 


RDW   (11.5-15.5)  %


 


Sodium  136 L  (137-145)  mmol/L


 


BUN  18 H  (7-17)  mg/dL


 


Calcium   (8.4-10.2)  mg/dL


 


Total Protein  5.8 L  (6.3-8.2)  g/dL


 


Albumin  3.2 L  (3.5-5.0)  g/dL











CT Scan - head: report reviewed





Assessment and Plan


(1) Brain metastasis


Current Visit: Yes   Status: Acute   Code(s): C79.31 - SECONDARY MALIGNANT 

NEOPLASM OF BRAIN   SNOMED Code(s): 56474266


   





(2) Breast cancer


Current Visit: Yes   Status: Acute   Code(s): C50.919 - MALIGNANT NEOPLASM OF 

UNSP SITE OF UNSPECIFIED FEMALE BREAST   SNOMED Code(s): 897205272


   





(3) Vasogenic brain edema


Current Visit: Yes   Status: Acute   Code(s): G93.6 - CEREBRAL EDEMA   SNOMED 

Code(s): 675550682


   


Plan: 





MRI Brain with and Without Contrast - Lorazepam prior for sedation


Dexamethasone 4mg q6 for vasogenic edema and PPPI


Dr. Diaz from Radiation oncology consulted, discussed with him for possible 

plan of gamma knofe to two lesions





Physician attest: I have completed the full history and physical and agree with 

above dictation, dictated as a ascribe.

## 2021-11-04 VITALS — SYSTOLIC BLOOD PRESSURE: 139 MMHG | DIASTOLIC BLOOD PRESSURE: 71 MMHG | TEMPERATURE: 96.9 F | HEART RATE: 71 BPM

## 2021-11-04 VITALS — RESPIRATION RATE: 17 BRPM

## 2021-11-04 LAB
ANION GAP SERPL CALC-SCNC: 5 MMOL/L
BUN SERPL-SCNC: 17 MG/DL (ref 7–17)
CALCIUM SPEC-MCNC: 10.1 MG/DL (ref 8.4–10.2)
CHLORIDE SERPL-SCNC: 106 MMOL/L (ref 98–107)
CO2 SERPL-SCNC: 27 MMOL/L (ref 22–30)
GLUCOSE SERPL-MCNC: 107 MG/DL (ref 74–99)
POTASSIUM SERPL-SCNC: 4.1 MMOL/L (ref 3.5–5.1)
SODIUM SERPL-SCNC: 138 MMOL/L (ref 137–145)

## 2021-11-04 RX ADMIN — CEFAZOLIN SCH MLS/HR: 330 INJECTION, POWDER, FOR SOLUTION INTRAMUSCULAR; INTRAVENOUS at 05:21

## 2021-11-04 RX ADMIN — DEXTROSE SCH MG: 50 INJECTION, SOLUTION INTRAVENOUS at 11:28

## 2021-11-04 RX ADMIN — PANTOPRAZOLE SODIUM SCH MG: 40 TABLET, DELAYED RELEASE ORAL at 07:51

## 2021-11-04 RX ADMIN — DEXTROSE SCH MG: 50 INJECTION, SOLUTION INTRAVENOUS at 05:22

## 2021-11-04 NOTE — P.CONS
History of Present Illness





- Reason for Consult


Consult date: 11/03/21


brain metastases


Requesting physician: Joe Galindo





- Chief Complaint


confusion





- History of Present Illness


The patient is a 75-year-old female with a history of metastatic triple negative

cancer of the left breast.  She was initially diagnosed with stage IV disease in

March 2021 with disease involving the liver, lungs and thoracic spine.  She 

underwent 3 cycles of systemic therapy with Abraxane/Atezo and had an excellent 

response.  Her most recent PET/CT from October 1 shows no active malignancy.  

Unfortunately, the patient now presents with a new finding of brain metastasis.





The patient's daughter reports that approximately 2 weeks ago she noticed some 

changes with her mom's thinking.  The patient reported she was having some 

difficulty balancing her checkbook and had some episodes of confusion which were

very unusual for her.  She reports no difficulty with headaches, but has had a 

couple episodes of nausea.  The patient underwent a CT scan of the brain on 

November 2 which revealed a new 2.6 x 2.8 cm cystic lesion in the right parietal

lobe, as well as in the right lower parietal lobe A separate 2.9 x 2.1 cm mass 

with vasogenic edema.  Her daughter feels she has had some spatial issues as 

well.  The patient continues to have treatment for pain felt to be related to 

her malignancy, which is relatively unchanged (if anything improved).  





With her recent CT findings, the patient was recommended to come through the ER.

 She has been started on dexamethasone, and her thinking does seem more clear at

the time of our conversation.  MRI of the brain was performed on November 3, 

which redemonstrated the 2 cystic lesions in the right temporal lobe, as well as

a separate 6 mm lesion in the left posterior frontal region.











Review of Systems


Constitutional: Denies chills, Denies fever


Eyes: denies blurred vision


Ears: deny: decreased hearing


Ears, nose, mouth and throat: Denies headache


Cardiovascular: Denies chest pain


Respiratory: Denies cough, Denies dyspnea


Gastrointestinal: Reports nausea, Reports vomiting


Genitourinary: Denies flank pain


Musculoskeletal: Reports gait dysfunction


Integumentary: Denies rash


Neurological: Reports change in mentation, Reports confusion, Reports gait 

dysfunction, Denies change in speech, Denies convulsions, Denies double vision, 

Denies headaches, Denies loss of vision, Denies memory loss, Denies numbness, 

Denies paralysis, Denies paresthesias, Denies syncope, Denies weakness





Past Medical History


Past Medical History: Cancer, Hypertension, Pneumonia, Thyroid Disorder


Additional Past Medical History / Comment(s): cancer to the spine, brain, lungs,

chemo 2021


History of Any Multi-Drug Resistant Organisms: None Reported


Past Surgical History: Back Surgery


Additional Past Surgical History / Comment(s): L4 surgery March 2021


Past Anesthesia/Blood Transfusion Reactions: No Reported Reaction


Past Psychological History: No Psychological Hx Reported


Smoking Status: Former smoker


Past Alcohol Use History: None Reported


Past Drug Use History: None Reported





- Past Family History


  ** Father


Family Medical History: Cancer


Additional Family Medical History / Comment(s): colon





  ** Sister(s)


Family Medical History: Cancer


Additional Family Medical History / Comment(s): breast





Medications and Allergies


                                Home Medications











 Medication  Instructions  Recorded  Confirmed  Type


 


Levothyroxine Sodium [Synthroid] 150 mcg PO DAILY 08/02/21 11/03/21 History


 


Calcium Carbonate [Calcium] 600 mg PO DAILY 11/02/21 11/03/21 History


 


Cholecalciferol [Vitamin D3 (25 25 mcg PO DAILY 11/02/21 11/03/21 History





Mcg = 1000 Iu)]    


 


Gabapentin [Neurontin] 300 mg PO DAILY 11/02/21 11/03/21 History


 


HYDROmorphone HCL 2 - 4 mg PO Q3H PRN 11/02/21 11/03/21 History


 


Sennosides [Senna] 17.2 mg PO DAILY 11/02/21 11/03/21 History


 


fentaNYL 50MCG/HR PATCH [Duragesic 50 mcg TRANSDERM Q72H 11/02/21 11/03/21 

History





50MCG/HR]    








                                    Allergies











Allergy/AdvReac Type Severity Reaction Status Date / Time


 


shellfish derived [Shellfish] Allergy  Vomiting Verified 11/03/21 11:28














Physical Exam


Vitals: 


                                   Vital Signs











  Temp Pulse Resp BP Pulse Ox


 


 11/04/21 05:26  96.9 F L  71  18  139/71  99


 


 11/04/21 02:18  96.2 F L  90  18  160/77  97


 


 11/03/21 20:00   95  17  


 


 11/03/21 14:00  98.6 F  95  17  113/68  95


 


 11/03/21 10:58      97








                                Intake and Output











 11/03/21 11/04/21 11/04/21





 22:59 06:59 14:59


 


Intake Total 750 600 


 


Balance 750 600 


 


Intake:   


 


  Intake, IV Titration 750 600 





  Amount   


 


    Sodium Chloride 0.9% 1, 750 600 





    000 ml @ 75 mls/hr IV .   





    [id number] [facility] Rx#:[id number]   


 


Other:   


 


  # Voids 1  














- Constitutional


General appearance: no acute distress





- EENT


Eyes: EOMI, PERRLA


ENT: hearing grossly normal





- Neck


Neck: no lymphadenopathy





- Respiratory


Respiratory: bilateral: CTA





- Cardiovascular


Rhythm: regular





- Gastrointestinal


General gastrointestinal: no soft, no tenderness





- Integumentary


Integumentary: no calor, no cellulitis





- Neurologic


Neurologic: CNII-XII intact





- Musculoskeletal


Musculoskeletal: strength equal bilaterally





- Psychiatric


Psychiatric: A&O x's 3, appropriate affect





Results


CBC & Chem 7: 


                                 11/03/21 03:11





                                 11/03/21 03:11


Labs: 


                  Abnormal Lab Results - Last 24 Hours (Table)











  11/02/21 Range/Units





  21:56 


 


TSH  49.400 H  (0.465-4.680)  mIU/L











CT Scan - head: report reviewed, image reviewed


MRI - head: report reviewed, image reviewed





Assessment and Plan


Assessment: 


The patient is a 75-year-old female with a history of metastatic triple negative

 cancer of the left breast.  She was initially diagnosed with stage IV disease 

in March 2021 with disease involving the liver, lungs and thoracic spine.  She 

underwent 3 cycles of systemic therapy with Abraxane/Atezo and had an excellent 

response.  Her most recent PET/CT from October 1 shows no active malignancy.  

Unfortunately, the patient now presents with a new finding of brain metastasis.





1.  Brain metastases:  This is the most likely cause of the patient's confusion,

 mild nausea and episodes of altered mentation.  Based on the patient's MRI, she

 presents with 3 separate metastatic lesions involving the brain.  I discussed 

with the patient that provided she had a small number of lesions, she would be a

 candidate for radiosurgery based treatment.  I discussed continuation of the 

patient's dexamethasone as her imaging does show some vasogenic edema.





I discussed with the patient that she would first require a CT simulation for 

treatment planning.  Treatment would be delivered over approximately 3-4 

fractions.   I discussed typical side effects of this treatment which includes, 

but are not limited to; fatigue, scalp irritation, reversible alopecia, 

headache, symptoms associated with edema such as nausea/vomiting or focal 

neurologic deficits.  I discussed the risk of late effects such as 

radionecrosis. The patient understood these recommendations and is in agreement 

to move forward treatment.





We will try to bring the patient over for simulation this AM (11/4) if possible.

  She may need an anxiolytic for mask making.





2.  Metastatic left breast cancer:  Based on outside reports this was felt to 

possibly be triple negative, but Dr. Ellsworth has been following with the patient 

and is working to confirm this.  As she has not had any recent systemic 

progression, her systemic therapy has been on hold.


Time with Patient: Greater than 30

## 2021-11-04 NOTE — P.PN
Subjective


Progress Note Date: 11/04/21


Principal diagnosis: 





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





She has no acute complaints today. She had her MRI of Brain. Revealing Right 

Prietal Occipital Rim enhancing lesion 3.4x2.8x3.2cm. A second lesion 

2.9x2.3x1.9cm also in the right Parietal region. Dr. Diaz from Radiation 

Oncology has seen and evaluated patient. Planning for mask creation and 

simulation today prior to discharge. She will stay on Dexamethasone at discharge

with PPI and this will be tapered by radiation oncology as we move forward with 

radiation. 





Objective





- Vital Signs


Vital signs: 


                                   Vital Signs











Temp  96.9 F L  11/04/21 05:26


 


Pulse  71   11/04/21 05:26


 


Resp  17   11/04/21 08:00


 


BP  139/71   11/04/21 05:26


 


Pulse Ox  99   11/04/21 05:26








                                 Intake & Output











 11/03/21 11/04/21 11/04/21





 18:59 06:59 18:59


 


Intake Total 750 600 


 


Balance 750 600 


 


Weight 78.5 kg  


 


Intake:   


 


  Intake, IV Titration 750 600 





  Amount   


 


    Sodium Chloride 0.9% 1, 750 600 





    000 ml @ 75 mls/hr IV .   





    [id number] SAMUEL Rx#:[id number]   


 


Other:   


 


  # Voids 1 1 1














- Constitutional


General appearance: Present: cooperative, no acute distress





- EENT


Eyes: Present: EOMI


ENT: Present: NA/AT





- Neck


Neck: Present: normal ROM





- Respiratory


Respiratory: bilateral: CTA





- Cardiovascular


Rhythm: regular





- Integumentary


Integumentary: Present: pale





- Neurologic


Neurologic Comment(s): 





non focal





- Musculoskeletal


Musculoskeletal: Present: generalized weakness





- Psychiatric


Psychiatric Comment(s): 





poor historian


Psychiatric: Present: A&O x's 3





- Labs


CBC & Chem 7: 


                                 11/03/21 03:11





                                 11/04/21 07:32


Labs: 


                  Abnormal Lab Results - Last 24 Hours (Table)











  11/04/21 Range/Units





  07:32 


 


Glucose  107 H  (74-99)  mg/dL














Assessment and Plan


(1) Brain metastasis


Status: Acute   Code(s): C79.31 - SECONDARY MALIGNANT NEOPLASM OF BRAIN   SNOMED

Code(s): 93083931


   





(2) Breast cancer


Status: Acute   Code(s): C50.919 - MALIGNANT NEOPLASM OF UNSP SITE OF 

UNSPECIFIED FEMALE BREAST   SNOMED Code(s): 723704776


   





(3) Vasogenic brain edema


Status: Acute   Code(s): G93.6 - CEREBRAL EDEMA   SNOMED Code(s): 132930674


   


Plan: 








Dr. Diaz from Radiation oncology to begin preparation for radiation to two 

parietal brain lesions





Discussed with primary team today and will be ok to discharge patient, 

Dexamethasone 4mg po TID and PPI sent to pharmacy for patient. Review of MRI, as

per HPI with patient and daughter. 





Follow-up appointment with Dr. Ellsworth will be rescheduled after radiation, a 

message has been sent to Rich in our office to assist with this. Patient does 

has a afollow-up on 11/29 with NP Rasheeda that has been added to her discharge 

summary.

## 2021-11-05 NOTE — P.DS
Providers


Date of admission: 


11/02/21 22:46





Expected date of discharge: 11/04/21


Attending physician: 


Goyo Stern





Consults: 





                                        





11/02/21 22:46


Consult Physician Routine 


   Consulting Provider: Maureen Ellsworth


   Consult Reason/Comments: known


   Do you want consulting provider notified?: Yes





11/03/21 08:35


Consult Physician Routine 


   Consulting Provider: Gatito Diaz


   Consult Reason/Comments: gamma knife new brain mets


   Do you want consulting provider notified?: Yes











Primary care physician: 


Briseida Griffith





Hospital Course: 








Final Diagnosis








-Metastatic lesion of the brain with vasogenic edema


-hypothyroidism


-Breast cancer metastatic disease not starting chemotherapy with metastases to 

spine


-Hypertension


-Peripheral neuropathy


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 


-DVT prophylaxis


-Full code





Discharge disposition


Patient is being discharged in a stable condition with guarded prognosis to 

home.  Patient will follow-up with Dr. Griffith in the outpatient setting upon 

discharge.  Patient is to also follow up with oncology and radiation oncology in

the outpatient setting.  Continue with decadron as prescribed. Total time taken 

is greater than 35 minutes.





Hospital course





Patient was a 75-year-old female was sent in because of her memory difficulties 

and patient is found to have computed tomography scan as an outpatient and found

to have a metastatic lesion in the brain along with vasogenic edema after which 

patient was a presented to the hospital and patient will undergo MRI imaging.  

Patient recently moved to the town patient was a diagnosed with a breast cancer 

received chemotherapy at her previous location patient is still undergoing 

workup now here and does not initiated on chemotherapy here in this town.  

Patient denied any fever chills patient has elevated TSH with normal T4 patient 

is supposed to take levothyroxine which she has not been taking and patient is 

being resumed on that medication at this time.





11/4/2021


Patient is seen in follow up and has been evaluated by oncology and radiation 

oncology and being fitted for head gear for continued radiation therapy for 

brain mets. Patient given a prescription for levothyroxine as she has not had 

and will need repeat tsh/t4 free labs in 4-6 weeks. TSH was elevated although 

patient has not been taking her medications. Patient started on decadron and 

will continue as prescribed per oncology. Patient to start treatments of 

radiation next week.Currently no reports of chest pain,  shortness of breath, or

palpitations.  Patient is afebrile.  No reports of nausea or vomiting and 

patient is tolerating diet.  Patient will be discharged home today.





GENERAL: The patient is alert and oriented x3, not in any acute distress. Well 

developed, well nourished. 


HEENT: Pupils are round and equally reacting to light. EOMI. No scleral icterus.

No conjunctival pallor. Normocephalic, atraumatic. No pharyngeal erythema. No 

thyromegaly. 


CARDIOVASCULAR: S1 and S2 present. No murmurs, rubs, or gallops. 


PULMONARY: Chest is clear to auscultation, no wheezing or crackles. 


ABDOMEN: Soft, nontender, nondistended, normoactive bowel sounds. No palpable 

organomegaly. 


MUSCULOSKELETAL: No joint swelling or deformity.


EXTREMITIES: No cyanosis, clubbing, or pedal edema. 


NEUROLOGICAL: Gross neurological examination did not reveal any focal deficits. 


SKIN: No rashes. 








Please refer to medication reconciliation sheet for a list of medications


Patient Condition at Discharge: Fair





Plan - Discharge Summary


Discharge Rx Participant: No


New Discharge Prescriptions: 


New


   Dexamethasone [Decadron] 4 mg PO TID #90 tablet


   Pantoprazole Sodium [Protonix] 40 mg PO AC-BID #60 tab





Continue


   Gabapentin [Neurontin] 300 mg PO DAILY


   Cholecalciferol [Vitamin D3 (25 Mcg = 1000 Iu)] 25 mcg PO DAILY


   fentaNYL 50MCG/HR PATCH [Duragesic 50MCG/HR] 50 mcg TRANSDERM Q72H


   Sennosides [Senna] 17.2 mg PO DAILY


   HYDROmorphone HCL 2 - 4 mg PO Q3H PRN


     PRN Reason: Pain


   Calcium Carbonate [Calcium] 600 mg PO DAILY


   Levothyroxine Sodium [Synthroid] 150 mcg PO DAILY 30 Days #45 tab


Discharge Medication List





Calcium Carbonate [Calcium] 600 mg PO DAILY 11/02/21 [History]


Cholecalciferol [Vitamin D3 (25 Mcg = 1000 Iu)] 25 mcg PO DAILY 11/02/21 

[History]


Gabapentin [Neurontin] 300 mg PO DAILY 11/02/21 [History]


HYDROmorphone HCL 2 - 4 mg PO Q3H PRN 11/02/21 [History]


Sennosides [Senna] 17.2 mg PO DAILY 11/02/21 [History]


fentaNYL 50MCG/HR PATCH [Duragesic 50MCG/HR] 50 mcg TRANSDERM Q72H 11/02/21 

[History]


Dexamethasone [Decadron] 4 mg PO TID #90 tablet 11/04/21 [Rx]


Levothyroxine Sodium [Synthroid] 150 mcg PO DAILY 30 Days #45 tab 11/04/21 [Rx]


Pantoprazole Sodium [Protonix] 40 mg PO AC-BID #60 tab 11/04/21 [Rx]








Follow up Appointment(s)/Referral(s): 


Keshia Vanessa NPC [Nurse Practitioner] - 11/29/21 10:15 am (keep this appt)


Gatito Diaz MD [STAFF PHYSICIAN] - 1 Week (to keep appt on the 11th for 

radiation treatment)


Briseida Griffith MD [Primary Care Provider] - 1-2 days (patient request that family 

member makes this follow up appointment for her)


Patient Instructions/Handouts:  Dexamethasone (By mouth), Pantoprazole (By 

mouth), Breast Cancer in Women (DC), Brain Metastasis (DC)


Activity/Diet/Wound Care/Special Instructions: 





Activity Limited until follow-up


Continue taking medications as prescribed


Follow-up with oncology outpatient


Follow-up with radiation oncology outpatient


Follow-up primary care provider on discharge


Continue current diet


Discharge Disposition: HOME SELF-CARE

## 2021-11-28 NOTE — CT
EXAMINATION TYPE: CT brain w con

 

DATE OF EXAM: 8/9/2021

 

COMPARISON: None

 

HISTORY: history of breast cancer, mets

 

CT DLP: 993.9 mGycm

Automated exposure control for dose reduction was used.

 

CONTRAST: 

CT scan of the head is performed with IV Contrast, patient injected with 100 mL of Isovue 300.

 

FINDINGS: 

There is no abnormal enhancing mass or midline shift identified.  The ventricles and sulci are within
 normal limits in size.  The globes are intact and the visualized sinuses are clear. Suspicion for a 
5 mm area of enhancement in the posterior parietal white matter on axial image 31. Recommend MRI. Cra
niocervical junction demonstrates low-lying cerebellar tonsils. Faint low attenuation in the white ma
tter are nonspecific but most typical of remote white matter ischemia.

 

IMPRESSION:

1. Suspicion for a 5 mm nodular area of enhancement posterior right parietal white matter axial image
 31. Recommend follow-up MRI.

2. Correlate for Chiari malformation. Recommend follow-up MRI. - 2000 calorie diet today, 2200 calorie diet for tomorrow  - KPhos 250 mg q12 hrs  - BMP/Mg/P daily  - Continuous telemetry

## 2022-01-04 ENCOUNTER — HOSPITAL ENCOUNTER (OUTPATIENT)
Dept: HOSPITAL 47 - RADMRIMAIN | Age: 77
Discharge: HOME | End: 2022-01-04
Attending: RADIOLOGY
Payer: MEDICARE

## 2022-01-04 DIAGNOSIS — C79.31: Primary | ICD-10-CM

## 2022-01-04 PROCEDURE — 70553 MRI BRAIN STEM W/O & W/DYE: CPT

## 2022-01-04 NOTE — MR
EXAMINATION TYPE: MR brain wo/w con

 

DATE OF EXAM: 1/4/2022

 

COMPARISON: MRI brain November 3, 2021

 

HISTORY: Secondary malignant neoplasm brain. History of metastatic breast cancer.

 

TECHNIQUE: 

Multiplanar, multisequence images of the brain and brainstem is performed without and with IV contras
t, utilizing 7 mL intravenous Gadavist .

 

FINDINGS: Diffusion weighted images demonstrate no evidence of a recent infarct or other diffusion ab
normality. The ventricular system and cisternal spaces are normal in size and appearance.  The brain 
volume is age appropriate.

 

Midline structures redemonstrate normal morphology.  The craniocervical junction remains within rayray
l limits.  

 

There is persistent thin-walled cystic lesion with peripheral rim enhancement high right parieto-occi
pital level measuring 2.1 x 1.9 cm axial image 55 with less prominent surrounding vasogenic edema dim
inished in size 3.4 x 2.8 cm prior study. There is second thin-walled cyst or cystic lesion right par
ietal temporal junction measuring 1.4 x 1.0 cm axial image 37 diminished in size from 2.9 x 2.3 cm pr
ior study. Improving surrounding vasogenic edema noted. Less well seen on current CT there is residua
l faint enhancing 1 to 2 mm mm focus high high posterior left frontal lobe axial image 56 decreased i
n size from 6 mm rim enhancement prior study.

 

There is 5 to 6 mm new enhancing lesion with surrounding vasogenic edema high left parietal lobe axia
l image 60. There is new rim-enhancing 1.2 x 1.2 cm superficial left frontal lesion axial image 48 wi
th some surrounding vasogenic edema.

 

The dural venous sinuses remaining patent. The visualized sinuses are clear and the globes are intact
 bilaterally. Nasal septum remains deviated to left of midline.

 

IMPRESSION: Overall mixed response as detailed above.

## 2022-01-21 ENCOUNTER — HOSPITAL ENCOUNTER (OUTPATIENT)
Dept: HOSPITAL 47 - RADXRMAIN | Age: 77
Discharge: HOME | End: 2022-01-21
Attending: INTERNAL MEDICINE
Payer: MEDICARE

## 2022-01-21 DIAGNOSIS — Z12.39: Primary | ICD-10-CM

## 2022-01-21 PROCEDURE — 78815 PET IMAGE W/CT SKULL-THIGH: CPT

## 2022-01-21 NOTE — PE
EXAMINATION TYPE: PET CT fusion skull to thigh

 

DATE OF EXAM: 1/21/2022

 

COMPARISON: Prior PET/CT October 1, 2021

 

HISTORY: Bilateral breast cancers per patient. Patient completed chemotherapy and radiation treatment
 March 2021.

 

TECHNIQUE:  Following the intravenous administration of 11.8 mCi of F-18 FDG, whole body images are p
erformed from the skull base to the midthigh.  Images are reviewed on the computer in the coronal, ax
ial, and sagittal planes.  Reconstructed rotating images are created on independent workstation and r
eviewed on the computer.   A localization and attenuation correction CT is performed in conjunction w
ith the PET scan. Blood glucose level equals 88.

 

SCAN: Subsequent Scan

 

FINDINGS: 

 

SKULL BASE AND NECK:  No new areas of abnormal hypermetabolic uptake.

 

CHEST, MEDIASTINUM, AND HILAR REGION: Suspect treatment changes to the left breast laterally with monika
cifications axial image 89 redemonstrated. Surgical clip left axilla now thought present axial image 
79 near site of prior prominent lymph node. Slight nodularity or nodular scarring posteriorly in the 
bilateral lung bases remains present without abnormal hypermetabolic uptake. No new areas of abnormal
 hypermetabolic uptake. Scattered fibroglandular tissue throughout right breast with new calcificatio
n and/or surgical clips.

 

ABDOMEN AND PELVIS: No new areas of abnormal hypermetabolic uptake. Normal excretion.

 

OSSEOUS STRUCTURES: No new areas of abnormal hypermetabolic uptake.

 

OTHER CT: Stable right internal jugular Mediport catheter. Mild Coronary artery calcification redemon
strated. Stable mild cardiomegaly. Prominent bilateral pulmonary arteries redemonstrated raising conc
carlos for underlying pulmonary artery hypertension.

 

Simple appearing thin-walled cysts throughout the liver redemonstrated. Small-to-moderate sized hiata
l hernia redemonstrated. Calcified near 1 cm thought to be uterine fibroid in the anterior fundus red
emonstrated.

 

Vertebroplasty at mild to moderate compression fracture L4 level again seen. Underlying scoliotic cur
vature. Facet arthropathy lower lumbar levels.

 

IMPRESSION: No suspicious hypermetabolic uptake to suggest active or recurrent metastatic hypermetabo
lic malignancy.

## 2022-03-24 ENCOUNTER — HOSPITAL ENCOUNTER (OUTPATIENT)
Dept: HOSPITAL 47 - RADMRIMAIN | Age: 77
Discharge: HOME | End: 2022-03-24
Attending: RADIOLOGY
Payer: MEDICARE

## 2022-03-24 DIAGNOSIS — C79.31: Primary | ICD-10-CM

## 2022-03-24 PROCEDURE — 70553 MRI BRAIN STEM W/O & W/DYE: CPT

## 2022-03-25 NOTE — MR
EXAMINATION TYPE: MR brain wo/w con

 

DATE OF EXAM: 3/24/2022

 

COMPARISON: Prior MRI brain January 4, 2022 and older study November 3, 2021

 

HISTORY: Secondary malignant neoplasm of brain follow up. Metastatic breast cancer.

 

TECHNIQUE: 

Multiplanar, multisequence images of the brain and brainstem is performed without and with IV contras
t, utilizing 7 mL intravenous Gadavist .

 

FINDINGS: Diffusion weighted images demonstrate no evidence of a recent infarct or other diffusion ab
normality. The ventricular system and cisternal spaces  remain normal in size and appearance.  The br
ain volume is age appropriate.

 

Midline structures redemonstrate normal morphology.  The craniocervical junction remains within rayray
l limits.  

 

There is persistent thin-walled cystic lesion with peripheral rim enhancement high right parieto-occi
pital level measuring 2.1 x 2.0 cm axial image 104 with increasing surrounding vasogenic edema otherw
ise stable in size from most recent MRI. There is more prominent rim-type enhancement with slight nod
ular component posteriorly redemonstrated.

 

The prior second thin-walled cyst or cystic lesion right parietal temporal junction is now only faint
ly identified on T2 and FLAIR weighted axial image 27 not clearly seen on postcontrast T1-weighted im
aging. This is overall improved. 

 

The prior visualized residual faint enhancing 1 to 2 mm mm focus high high posterior left frontal lob
e axial image 56 prior study now shows 1 to 2 mm focal T1 round hypointensity.

 

There is 8 x 5 mm enlarging enhancing lesion with worsening surrounding vasogenic edema high left par
ietal lobe axial image 118 current study. 

 

The prior visualized rim-enhancing 1.2 x 1.2 cm superficial left frontal lesion axial image 48 with s
ome surrounding vasogenic edema now more elongated measuring 1.2 x 0.7 cm axial image 97 with improve
d surrounding edema.

 

There is new high 4 mm enhancing metastatic focus axial image 122 with surrounding vasogenic edema

 

The dural venous sinuses remaining patent. The visualized sinuses are clear and the globes are intact
 bilaterally. Nasal septum remains deviated to left of midline.

 

IMPRESSION: Overall mixed response as detailed above.

## 2022-05-20 ENCOUNTER — HOSPITAL ENCOUNTER (OUTPATIENT)
Dept: HOSPITAL 47 - RADMRIMAIN | Age: 77
Discharge: HOME | End: 2022-05-20
Attending: RADIOLOGY
Payer: MEDICARE

## 2022-05-20 DIAGNOSIS — C78.01: ICD-10-CM

## 2022-05-20 DIAGNOSIS — C50.412: ICD-10-CM

## 2022-05-20 DIAGNOSIS — C79.31: Primary | ICD-10-CM

## 2022-05-20 DIAGNOSIS — Z92.3: ICD-10-CM

## 2022-05-20 DIAGNOSIS — C78.7: ICD-10-CM

## 2022-05-20 PROCEDURE — 70553 MRI BRAIN STEM W/O & W/DYE: CPT

## 2022-05-20 NOTE — MR
EXAMINATION TYPE: MR brain wo/w con

 

DATE OF EXAM: 5/20/2022

 

COMPARISON: 3/24/2022

 

HISTORY: Follow-up brain metastases

 

TECHNIQUE: 

Multiplanar, multisequence images of the brain and brainstem is performed without and with IV contras
t, utilizing 7 mL intravenous Gadavist .

 

FINDINGS:

New Partially cystic partially solid nodule right frontal lobe measuring 2.5 mm image 93. New left fr
ontal 6.2 mm partially cystic partially solid lesion image 93 of 166. Enlarging predominantly cystic 
lesion high right frontal parietal region image 110 currently measuring 1.8 x 1.7 cm versus 1.7 x 1.5
 cm previously. Surrounding vasogenic edema. Additional partially solid partially cystic lesion high 
left frontal parietal region is also slightly larger in size image 119 and measures 8 x 9 mm versus 8
 x 5 mm previously. High left frontal lesion is enlarged as well measuring 7 mm versus 4 mm image 122
. Stable mixed lesion left frontal lobe measuring 11 x 5 mm versus 12 x 7 mm previously. No additiona
l lesions are seen with certainty.

 

Diffusion weighted images demonstrate no evidence of a recent infarct or other diffusion abnormality.
  There is no extra-axial fluid collection  The ventricular system and cisternal spaces are normal in
 size and appearance. Mild age related atrophic changes. Midline structures demonstrate normal morpho
logy.  The craniocervical junction appears within normal limits.  The visualized sinuses are clear an
d the globes are intact.

 

IMPRESSION: 

1. Progression of metastatic disease with a couple of new lesions as discussed above and enlarging le
sions.

## 2022-06-17 ENCOUNTER — HOSPITAL ENCOUNTER (OUTPATIENT)
Dept: HOSPITAL 47 - RADPETMAIN | Age: 77
Discharge: HOME | End: 2022-06-17
Attending: INTERNAL MEDICINE
Payer: MEDICARE

## 2022-06-17 DIAGNOSIS — C50.012: Primary | ICD-10-CM

## 2022-06-17 PROCEDURE — 78815 PET IMAGE W/CT SKULL-THIGH: CPT

## 2022-06-20 NOTE — PE
Nuclear medicine PET/CT

 

HISTORY: Metastatic left breast cancer, subsequent

 

Patient received 12.6 mCi F-18 FDG intravenously and delayed scanning was performed from the skull ba
se to the mid thighs. A localization and attenuation correction CT scan was performed.

 

Correlation to prior nuclear medicine PET/CT 1/21/2022

 

Average mediastinal uptake SUV 1.7, average liver uptake SUV 2.1

 

Brain lesion is partially visualized.

 

Chest and neck: There is no suspicious uptake. No cervical or supraclavicular adenopathy.

 

There is a port in the right pectoral region, catheter courses via an intraventricular jugular approa
ch to the cavoatrial junction. There is some mitral annular calcification suspected, coronary artery 
calcification. There is no pleural or pericardial effusion. No evident lung mass. There is no mediast
inal, axillary, or hilar adenopathy.

 

ABDOMEN: There are hypodense foci within the liver, additionally some scattered peripheral probable c
ystic areas are present. The large masses within the liver show hypermetabolic uptake and have develo
ped in the interval, SUV range approximately 7.9-16. 4 lesions are present. Additionally there is ret
roperitoneal, portal adenopathy, SUV values 67 range. There is no ascites. No pelvic adenopathy.

 

Osseous structures show no suspicious uptake.

 

IMPRESSION: Metastatic disease to the liver and brain